# Patient Record
Sex: FEMALE | Race: WHITE | Employment: FULL TIME | URBAN - METROPOLITAN AREA
[De-identification: names, ages, dates, MRNs, and addresses within clinical notes are randomized per-mention and may not be internally consistent; named-entity substitution may affect disease eponyms.]

---

## 2018-04-13 ENCOUNTER — HOSPITAL ENCOUNTER (OUTPATIENT)
Dept: CARDIOLOGY | Age: 58
Discharge: HOME | End: 2018-04-13
Payer: COMMERCIAL

## 2018-04-13 ENCOUNTER — NURSE ONLY (OUTPATIENT)
Dept: CARDIOLOGY CLINIC | Age: 58
End: 2018-04-13
Payer: COMMERCIAL

## 2018-04-13 DIAGNOSIS — R55 SYNCOPE, UNSPECIFIED SYNCOPE TYPE: ICD-10-CM

## 2018-04-13 DIAGNOSIS — Z13.6 SCREENING FOR CARDIOVASCULAR CONDITION: ICD-10-CM

## 2018-04-13 LAB
LV EF: 55 %
LVEF MODALITY: NORMAL

## 2018-04-13 PROCEDURE — 93306 TTE W/DOPPLER COMPLETE: CPT

## 2018-04-13 PROCEDURE — 93000 ELECTROCARDIOGRAM COMPLETE: CPT | Performed by: INTERNAL MEDICINE

## 2018-05-25 ENCOUNTER — HOSPITAL ENCOUNTER (OUTPATIENT)
Dept: CARDIOLOGY | Age: 58
Discharge: HOME | End: 2018-05-25
Payer: COMMERCIAL

## 2018-05-25 VITALS
WEIGHT: 155 LBS | BODY MASS INDEX: 29.27 KG/M2 | SYSTOLIC BLOOD PRESSURE: 120 MMHG | OXYGEN SATURATION: 98 % | HEIGHT: 61 IN | DIASTOLIC BLOOD PRESSURE: 70 MMHG | HEART RATE: 82 BPM

## 2018-05-25 DIAGNOSIS — I20.8 ANGINAL EQUIVALENT (HCC): Primary | ICD-10-CM

## 2018-05-25 PROCEDURE — A9500 TC99M SESTAMIBI: HCPCS | Performed by: INTERNAL MEDICINE

## 2018-05-25 PROCEDURE — 93017 CV STRESS TEST TRACING ONLY: CPT

## 2018-05-25 PROCEDURE — 2580000003 HC RX 258: Performed by: INTERNAL MEDICINE

## 2018-05-25 PROCEDURE — 3430000000 HC RX DIAGNOSTIC RADIOPHARMACEUTICAL: Performed by: INTERNAL MEDICINE

## 2018-05-25 PROCEDURE — 78452 HT MUSCLE IMAGE SPECT MULT: CPT

## 2018-05-25 RX ORDER — SODIUM CHLORIDE 0.9 % (FLUSH) 0.9 %
10 SYRINGE (ML) INJECTION PRN
Status: DISCONTINUED | OUTPATIENT
Start: 2018-05-25 | End: 2018-05-26 | Stop reason: HOSPADM

## 2018-05-25 RX ORDER — BUDESONIDE AND FORMOTEROL FUMARATE DIHYDRATE 160; 4.5 UG/1; UG/1
2 AEROSOL RESPIRATORY (INHALATION) 2 TIMES DAILY
COMMUNITY
End: 2019-05-13

## 2018-05-25 RX ADMIN — Medication 10 ML: at 08:46

## 2018-05-25 RX ADMIN — Medication 10 ML: at 10:04

## 2018-05-25 RX ADMIN — Medication 33.7 MILLICURIE: at 10:04

## 2018-05-25 RX ADMIN — Medication 10.6 MILLICURIE: at 08:46

## 2018-05-30 ENCOUNTER — TELEPHONE (OUTPATIENT)
Dept: NON INVASIVE DIAGNOSTICS | Age: 58
End: 2018-05-30

## 2018-06-08 ENCOUNTER — OFFICE VISIT (OUTPATIENT)
Dept: NON INVASIVE DIAGNOSTICS | Age: 58
End: 2018-06-08
Payer: COMMERCIAL

## 2018-06-08 VITALS
SYSTOLIC BLOOD PRESSURE: 118 MMHG | WEIGHT: 157 LBS | DIASTOLIC BLOOD PRESSURE: 74 MMHG | HEART RATE: 74 BPM | RESPIRATION RATE: 14 BRPM | BODY MASS INDEX: 29.64 KG/M2 | HEIGHT: 61 IN

## 2018-06-08 DIAGNOSIS — R55 VASOVAGAL SYNCOPE: Primary | ICD-10-CM

## 2018-06-08 DIAGNOSIS — I10 ESSENTIAL HYPERTENSION: ICD-10-CM

## 2018-06-08 DIAGNOSIS — E66.3 OVERWEIGHT: ICD-10-CM

## 2018-06-08 PROCEDURE — G8427 DOCREV CUR MEDS BY ELIG CLIN: HCPCS | Performed by: INTERNAL MEDICINE

## 2018-06-08 PROCEDURE — 93000 ELECTROCARDIOGRAM COMPLETE: CPT | Performed by: INTERNAL MEDICINE

## 2018-06-08 PROCEDURE — G8599 NO ASA/ANTIPLAT THER USE RNG: HCPCS | Performed by: INTERNAL MEDICINE

## 2018-06-08 PROCEDURE — 3014F SCREEN MAMMO DOC REV: CPT | Performed by: INTERNAL MEDICINE

## 2018-06-08 PROCEDURE — G8417 CALC BMI ABV UP PARAM F/U: HCPCS | Performed by: INTERNAL MEDICINE

## 2018-06-08 PROCEDURE — 3017F COLORECTAL CA SCREEN DOC REV: CPT | Performed by: INTERNAL MEDICINE

## 2018-06-08 PROCEDURE — 99204 OFFICE O/P NEW MOD 45 MIN: CPT | Performed by: INTERNAL MEDICINE

## 2018-06-08 PROCEDURE — 1036F TOBACCO NON-USER: CPT | Performed by: INTERNAL MEDICINE

## 2018-06-08 NOTE — PROGRESS NOTES
Outpatient Prescriptions   Medication Sig Dispense Refill    amLODIPine (NORVASC) 10 MG tablet take 1 tablet by mouth daily 30 tablet 3    valsartan-hydrochlorothiazide (DIOVAN-HCT) 160-25 MG per tablet take 1 tablet by mouth daily 30 tablet 3    escitalopram (LEXAPRO) 10 MG tablet Take 1 tablet by mouth daily 30 tablet 3    Calcium-Vitamin D (CALTRATE 600 PLUS-VIT D PO) Take 1 tablet by mouth      Multiple Vitamins-Minerals (THERAPEUTIC MULTIVITAMIN-MINERALS) tablet Take 1 tablet by mouth daily      budesonide-formoterol (SYMBICORT) 160-4.5 MCG/ACT AERO Inhale 2 puffs into the lungs 2 times daily      PROAIR  (90 Base) MCG/ACT inhaler take 2 puffs every 4 hours if needed  0     No current facility-administered medications for this visit. No Known Allergies    ROS:   Constitutional: Negative for fever, activity change and appetite change. HENT: Negative for epistaxis. Eyes: Negative for diploplia, blurred vision. Respiratory: Negative for cough, chest tightness, shortness of breath and wheezing. Cardiovascular: pertinent positives in HPI  Gastrointestinal: Negative for abdominal pain and blood in stool. All other review of systems are negative     PHYSICAL EXAM:   Constitutional   Vitals:    06/08/18 0828   BP: 118/74   Pulse: 74   Resp: 14   Weight: 157 lb (71.2 kg)   Height: 5' 1\" (1.549 m)    Well-developed, no acute distress, well groomed  Eyes: conjunctivae normal, no xanthelasma   Ears, Nose, Throat: oral mucosa moist, no cyanosis   CV: normal rate, regular rhythm, no JVD no murmurs, rubs, or gallops.  PMI is nondisplaced, Peripheral pulses normal including carotid auscultation, no noted aortic bruit, bilateral femoral and pedal pulses are normal in quality  Lungs: clear to auscultation bilaterally, normal respiratory effort without used of accessory muscles, no wheezes  Abdomen: soft, non-tender, bowel sounds present, no masses or hepatomegaly   Musculoskeletal: no digital Dimension: 2.6 cm   LV FS:28.6 %    ml   LV PW           LV Volume Systolic: 58.5 ml   Diastolic: 0.8  LV EDV/LV EDV Index: 115.5   cm              ml/67 ml/m^2LV ESV/LV ESV    RV Diastolic Dimension: 3.9   LV PW Systolic: Index: 58.1 SK/95OZ/ m^2     cm   1.3 cm          EF Calculated: 56.3 %   Septum          LV Mass Index: 83 l/min*m^2  LA/Aorta: 3.67   Diastolic: 0.9                               Ascending Aorta: 3.1 cm   cm                                           LA volume/Index: 47.7 ml   Septum          LVOT: 1.7 cm                 /78ZI/E^2   Systolic: 1.1                                RA Area: 12.2 cm^2   cm                                                IVC Expiration: 1.2 cm   LV Mass: 141.91   g     Doppler Measurements & Calculations      MV Peak E-Wave:   AV Peak Velocity: 1.96 m/s     LVOT Peak Velocity: 1.36   1.1 m/s           AV Peak Gradient: 15.41 mmHg   m/s   MV Peak A-Wave:   AV Mean Velocity: 1.11 m/s     LVOT Mean Velocity: 0.81   0.85 m/s          AV Mean Gradient: 6 mmHg       m/s   MV E/A Ratio:     AV VTI: 42.9 cm                LVOT Peak Gradient: 7.4   1.29              AV Area (Continuity):1.41 cm^2 mmHgLVOT Mean Gradient: 3   MV Peak Gradient:                                mmHg   5.2 mmHg          LVOT VTI: 26.7 cm              Estimated RVSP: 30.3 mmHg   MV Mean Gradient: IVRT: 96.9 msec                Estimated RAP:3 mmHg   1.9 mmHg          Estimated PASP: 30.27 mmHg   MV Mean Velocity: Pulm. Vein A Reversal   0.62 m/s          Duration:83 msec               TR Velocity:2.61 m/s   MV Deceleration   Pulm. Vein D Velocity:0.45     TR Gradient:27.27 mmHg   Time: 169.1 msec  m/sPulm. Vein A Reversal       PV Peak Velocity: 1.24   MV P1/2t: 84.4    Velocity:0.29 m/s              m/s   msec              Pulm.  Vein S Velocity: 0.71    PV Peak Gradient: 6.11   MVA by PHT:2.61   m/s                            mmHg   cm^2                                             PV Mean Velocity: 0.69   MV Area                                          m/s   (continuity): 1.7                                PV Mean Gradient: 2.3   cm^2                                             mmHg   MV E' Septal   Velocity: 0.07   m/s   MV E' Lateral   Velocity: 10 m/s               Last Stress Test:   [address] [address],[facility] - [address]  [phone]                                            Exercise Stress Nuclear Gated SPECT Study     Name: [facility] Account Number: [de-identified]     :  1960      Sex: female                                                          Date of Study:  2018     Height: 5' 1\" (154.9 cm)  Weight: 155 lb (70.3 kg)          Ordering Provider: Roma Stuart MD  PCP: Roma Stuart MD       Cardiologist: Nabil Cabral                         Interpreting Physician: Sarika Alexander. [facility], [place], [place]  _________________________________________________________________________________     Indication:        Detecting the presence and location of coronary artery disease     Clinical History:   Patient has no known history of coronary artery disease.     Resting ECG:    DC int .18 sec, QRS int .08 sec, QT int .38 sec; HR 79 bpm  Normal sinus rhythm     Exercise: The patient exercised using a Yordy protocol, completing 8.01 minutes and reaching an estimated work load of 35.8 metabolic equivalents (METS). Resting HR was 79. Peak exercise heart rate was 143 ( 87% of maximum predicted heart rate for age). Baseline /82. Peak exercise /84.      The blood pressure response to exercise was normal       Exercise was terminated due to general exercise fatigue, including increased breathing. . She denied chest discomfort associated with increased breathing during stress exercise     The patient experienced no chest pain with exercise.        Pulse oximetry was used to monitor oxygen saturation during the stress test.  The given in a handout  - removal of any medications which CAN exacerbate her dizziness were also discussed   - we discussed avoidance manuvers including counterpulsation and recognition of symptoms so to perform these avoidance maneuvers as discussed  - EKG has not shown any other clear arrhythmogenic cause (WPW, HOCM, LQT, Brugada, ARVC)  - discussed increased sodium intake, increased fluid intake, compression stockings, counterpulsation maneuvers  - discussed consideration of medical therapy IF lifestyle modifications are not sufficient, but they are willing to try to proceed without medical therapy for now  - offered ILR insertion to look for paroxysmal arrhythmias  - we discussed option of tilt table testing, however would also be reasonable to attempt these preventative measures 1st prior to testing    2. HTN  - well controlled at this visit  - monitored by PCP    3. Anxiety  - on Lexapro    4. Overweight  - Body mass index is 29.66 kg/m². - encouraged weight management    Plan:  1. Continue current medication  2. Encouraged increased sodium intake, increased fluid intake, compression stockings, counterpulsation maneuvers  3. Patient to consider Tilt table or ILR insertion - will call the office with her decision  4. Continual monitoring of her BP at home  5. Patient to follow up PRN. Instructed the patient to call if she has any questions or concerns. Thank you for allowing me to participate in your patient's care. Please call me if there are any questions.       Marilu Hooper MD, [facility]  Cardiac Electrophysiology  [facility] [place] [facility]  [facility]: [facility]  8:46 AM  6/8/2018

## 2018-07-20 PROBLEM — R55 VASOVAGAL SYNCOPE: Status: ACTIVE | Noted: 2018-07-20

## 2018-07-20 PROBLEM — E66.3 OVERWEIGHT: Status: ACTIVE | Noted: 2018-07-20

## 2021-05-10 ENCOUNTER — OFFICE VISIT (OUTPATIENT)
Dept: FAMILY MEDICINE CLINIC | Age: 61
End: 2021-05-10
Payer: COMMERCIAL

## 2021-05-10 VITALS
SYSTOLIC BLOOD PRESSURE: 126 MMHG | DIASTOLIC BLOOD PRESSURE: 82 MMHG | RESPIRATION RATE: 18 BRPM | HEART RATE: 84 BPM | TEMPERATURE: 97.8 F | OXYGEN SATURATION: 99 % | HEIGHT: 61 IN | WEIGHT: 138 LBS | BODY MASS INDEX: 26.06 KG/M2

## 2021-05-10 DIAGNOSIS — M25.512 ACUTE PAIN OF LEFT SHOULDER: ICD-10-CM

## 2021-05-10 DIAGNOSIS — M54.12 CERVICAL RADICULOPATHY: ICD-10-CM

## 2021-05-10 DIAGNOSIS — M54.2 NECK PAIN: Primary | ICD-10-CM

## 2021-05-10 PROCEDURE — 99203 OFFICE O/P NEW LOW 30 MIN: CPT | Performed by: PHYSICIAN ASSISTANT

## 2021-05-10 PROCEDURE — 3017F COLORECTAL CA SCREEN DOC REV: CPT | Performed by: PHYSICIAN ASSISTANT

## 2021-05-10 PROCEDURE — G8419 CALC BMI OUT NRM PARAM NOF/U: HCPCS | Performed by: PHYSICIAN ASSISTANT

## 2021-05-10 PROCEDURE — 96372 THER/PROPH/DIAG INJ SC/IM: CPT | Performed by: PHYSICIAN ASSISTANT

## 2021-05-10 PROCEDURE — G8427 DOCREV CUR MEDS BY ELIG CLIN: HCPCS | Performed by: PHYSICIAN ASSISTANT

## 2021-05-10 PROCEDURE — 1036F TOBACCO NON-USER: CPT | Performed by: PHYSICIAN ASSISTANT

## 2021-05-10 RX ORDER — TIZANIDINE 4 MG/1
4 TABLET ORAL 4 TIMES DAILY PRN
Qty: 40 TABLET | Refills: 0 | Status: SHIPPED | OUTPATIENT
Start: 2021-05-10

## 2021-05-10 RX ORDER — PREDNISONE 20 MG/1
TABLET ORAL
Qty: 18 TABLET | Refills: 0 | Status: SHIPPED | OUTPATIENT
Start: 2021-05-10

## 2021-05-10 RX ORDER — KETOROLAC TROMETHAMINE 30 MG/ML
30 INJECTION, SOLUTION INTRAMUSCULAR; INTRAVENOUS ONCE
Status: COMPLETED | OUTPATIENT
Start: 2021-05-10 | End: 2021-05-10

## 2021-05-10 RX ORDER — METHYLPREDNISOLONE ACETATE 80 MG/ML
60 INJECTION, SUSPENSION INTRA-ARTICULAR; INTRALESIONAL; INTRAMUSCULAR; SOFT TISSUE ONCE
Status: COMPLETED | OUTPATIENT
Start: 2021-05-10 | End: 2021-05-10

## 2021-05-10 RX ORDER — METHYLPREDNISOLONE 4 MG/1
TABLET ORAL
COMMUNITY
Start: 2021-05-06

## 2021-05-10 RX ADMIN — KETOROLAC TROMETHAMINE 30 MG: 30 INJECTION, SOLUTION INTRAMUSCULAR; INTRAVENOUS at 10:37

## 2021-05-10 RX ADMIN — METHYLPREDNISOLONE ACETATE 60 MG: 80 INJECTION, SUSPENSION INTRA-ARTICULAR; INTRALESIONAL; INTRAMUSCULAR; SOFT TISSUE at 10:37

## 2021-05-10 NOTE — PROGRESS NOTES
5/10/21  Jesus Valentin : 1960 Sex: female  Age 62 y.o. Subjective:  Chief Complaint   Patient presents with    Shoulder Pain     left arm pain for over a week          HPI:   Jesus Valentin , 62 y.o. female presents to express care for evaluation of left posterior shoulder pain, neck pain. The patient has a history of some chronic neck issues. The patient has also had a frozen shoulder in the past.  Has been evaluated by Dr. Candice Ballesteros and has been on physical therapy and had intra-articular injections performed. The patient states that she also has a history of bursitis. This seems to be different. The patient went to an urgent care on Saturday. They did images of the neck and the shoulder and gave the patient a Medrol Dosepak. For about 1 day it seemed to improve. The patient states that the symptoms now seem to be worsening. The patient is not having any numbness or to the left upper extremity. She is having any chest pain. This has been ongoing about a week. We discussed possible ACS symptoms and she denies any of those      ROS:   Unless otherwise stated in this report the patient's positive and negative responses for review of systems for constitutional, eyes, ENT, cardiovascular, respiratory, gastrointestinal, neurological, , musculoskeletal, and integument systems and related systems to the presenting problem are either stated in the history of present illness or were not pertinent or were negative for the symptoms and/or complaints related to the presenting medical problem. Positives and pertinent negatives as per HPI. All others reviewed and are negative.       PMH:     Past Medical History:   Diagnosis Date    Hypertension        Past Surgical History:   Procedure Laterality Date     SECTION         Family History   Problem Relation Age of Onset    Hypertension Mother     Mult Sclerosis Brother        Medications:     Current Outpatient Medications:    methylPREDNISolone (MEDROL DOSEPACK) 4 MG tablet, use as directed for 6 days, Disp: , Rfl:     predniSONE (DELTASONE) 20 MG tablet, 3 tablets once daily for 3 days, 2 tablets once daily for 3 days, one tablet once daily for 3 days, Disp: 18 tablet, Rfl: 0    tiZANidine (ZANAFLEX) 4 MG tablet, Take 1 tablet by mouth 4 times daily as needed (neck/back pain), Disp: 40 tablet, Rfl: 0    amLODIPine (NORVASC) 10 MG tablet, take 1 tablet by mouth once daily, Disp: 30 tablet, Rfl: 3    escitalopram (LEXAPRO) 10 MG tablet, take 1 tablet by mouth once daily, Disp: 30 tablet, Rfl: 2    valsartan-hydroCHLOROthiazide (DIOVAN-HCT) 160-25 MG per tablet, take 1 tablet by mouth once daily, Disp: 30 tablet, Rfl: 3    Calcium-Vitamin D (CALTRATE 600 PLUS-VIT D PO), Take 1 tablet by mouth, Disp: , Rfl:     Multiple Vitamins-Minerals (THERAPEUTIC MULTIVITAMIN-MINERALS) tablet, Take 1 tablet by mouth daily, Disp: , Rfl:     Allergies:   No Known Allergies    Social History:     Social History     Tobacco Use    Smoking status: Never Smoker    Smokeless tobacco: Never Used   Substance Use Topics    Alcohol use: Yes     Comment: once a month    Drug use: No       Patient lives at home. Physical Exam:     Vitals:    05/10/21 1001   BP: 126/82   Pulse: 84   Resp: 18   Temp: 97.8 °F (36.6 °C)   SpO2: 99%   Weight: 138 lb (62.6 kg)   Height: 5' 1\" (1.549 m)       Exam:  Physical Exam  Nurses note and vital signs reviewed and patient is not hypoxic. General: The patient appears well and in no apparent distress. Patient is resting comfortably on cart. Skin: Warm, dry, no pallor noted. There is no rash noted. Head: Normocephalic, atraumatic.   Eye: Normal conjunctiva  Neck: The patient has reproducible pain to the left paracervical musculature at the base, no significant superior tenderness, no midline tenderness, no right-sided tenderness  Ears, Nose, Mouth, and Throat: Oral mucosa is moist  Cardiovascular: Regular Rate and pain  -     Katherine Alcantar DC,Chiropractic Medicine, [facility]    Cervical radiculopathy    Acute pain of left shoulder    Other orders  -     methylPREDNISolone acetate (DEPO-MEDROL) injection 60 mg  -     ketorolac (TORADOL) injection 30 mg  -     predniSONE (DELTASONE) 20 MG tablet; 3 tablets once daily for 3 days, 2 tablets once daily for 3 days, one tablet once daily for 3 days  -     tiZANidine (ZANAFLEX) 4 MG tablet; Take 1 tablet by mouth 4 times daily as needed (neck/back pain)        The patient is to call for any concerns or return if any of the signs or symptoms worsen. The patient is to follow-up with PCP in the next 2-3 days for repeat evaluation repeat assessment or go directly to the emergency department.      SIGNATURE: Geovanna Enrique III, PA-C

## 2021-05-12 ENCOUNTER — OFFICE VISIT (OUTPATIENT)
Dept: CHIROPRACTIC MEDICINE | Age: 61
End: 2021-05-12
Payer: COMMERCIAL

## 2021-05-12 VITALS — OXYGEN SATURATION: 98 % | RESPIRATION RATE: 14 BRPM | HEART RATE: 60 BPM | TEMPERATURE: 98 F

## 2021-05-12 DIAGNOSIS — M54.6 ACUTE LEFT-SIDED THORACIC BACK PAIN: Primary | ICD-10-CM

## 2021-05-12 DIAGNOSIS — M54.2 CERVICALGIA: ICD-10-CM

## 2021-05-12 PROCEDURE — 3017F COLORECTAL CA SCREEN DOC REV: CPT | Performed by: CHIROPRACTOR

## 2021-05-12 PROCEDURE — 98940 CHIROPRACT MANJ 1-2 REGIONS: CPT | Performed by: CHIROPRACTOR

## 2021-05-12 PROCEDURE — G8419 CALC BMI OUT NRM PARAM NOF/U: HCPCS | Performed by: CHIROPRACTOR

## 2021-05-12 PROCEDURE — G8427 DOCREV CUR MEDS BY ELIG CLIN: HCPCS | Performed by: CHIROPRACTOR

## 2021-05-12 PROCEDURE — 1036F TOBACCO NON-USER: CPT | Performed by: CHIROPRACTOR

## 2021-05-12 PROCEDURE — 99203 OFFICE O/P NEW LOW 30 MIN: CPT | Performed by: CHIROPRACTOR

## 2021-05-12 PROCEDURE — 97014 ELECTRIC STIMULATION THERAPY: CPT | Performed by: CHIROPRACTOR

## 2021-05-12 ASSESSMENT — ENCOUNTER SYMPTOMS
COUGH: 0
BACK PAIN: 1
SHORTNESS OF BREATH: 0

## 2021-05-12 NOTE — PROGRESS NOTES
Negative for weakness and headaches.          Current Outpatient Medications:     methylPREDNISolone (MEDROL DOSEPACK) 4 MG tablet, use as directed for 6 days, Disp: , Rfl:     predniSONE (DELTASONE) 20 MG tablet, 3 tablets once daily for 3 days, 2 tablets once daily for 3 days, one tablet once daily for 3 days, Disp: 18 tablet, Rfl: 0    tiZANidine (ZANAFLEX) 4 MG tablet, Take 1 tablet by mouth 4 times daily as needed (neck/back pain), Disp: 40 tablet, Rfl: 0    amLODIPine (NORVASC) 10 MG tablet, take 1 tablet by mouth once daily, Disp: 30 tablet, Rfl: 3    escitalopram (LEXAPRO) 10 MG tablet, take 1 tablet by mouth once daily, Disp: 30 tablet, Rfl: 2    valsartan-hydroCHLOROthiazide (DIOVAN-HCT) 160-25 MG per tablet, take 1 tablet by mouth once daily, Disp: 30 tablet, Rfl: 3    Calcium-Vitamin D (CALTRATE 600 PLUS-VIT D PO), Take 1 tablet by mouth, Disp: , Rfl:     Multiple Vitamins-Minerals (THERAPEUTIC MULTIVITAMIN-MINERALS) tablet, Take 1 tablet by mouth daily, Disp: , Rfl:     No Known Allergies    Past Medical History:   Diagnosis Date    Hypertension      Family History   Problem Relation Age of Onset    Hypertension Mother     Mult Sclerosis Brother      Past Surgical History:   Procedure Laterality Date     SECTION       Social History     Socioeconomic History    Marital status:      Spouse name: Not on file    Number of children: Not on file    Years of education: Not on file    Highest education level: Not on file   Occupational History    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 She displays full, complete active range of motion of the bilateral glenohumeral joints. Dena Garcia was seen today for neck pain, back pain and shoulder pain. Diagnoses and all orders for this visit:    Acute left-sided thoracic back pain    Cervicalgia      Attempting to track down x-rays from [address]:   I spoke with her regarding my exam findings and treatment options. Cervical radiculopathy versus left thoracic facet capsulitis. Based on my exam I believe it is leaning closer to the capsulitis but I cannot rule out the radiculopathy at this point. She does have a normal upper extremity neurological exam today. I recommended that we start a trial of conservative care today consisting EMS, manipulation/mobilization and home-based self-care. I will plan on seeing her 2 times per week for 3 weeks, then reassess to determine response to care and future options. With consent, I did begin today. Problem/Goals  Decrease pain and/or swelling    Today's Treatment  EMS with heat to the upper thoracic region for 15 minutes to address muscle spasm/hypertension and alleviate pain. Diversified manipulations listed thoracic segments. Tolerated well. Going to have her start in sphinx position --cat-cow x10 and cervical rotations x5 bilateral.  I want her doing these 2 times per day. I spoke to her regarding posttreatment soreness. Should any arise, she  may use ice for 10-15 minutes, over-the-counter NSAIDs or topical gels. She is leaving for vacation. Advised her to try some topical patches or gels during the car ride. I want her to keep it moving, do her exercisesgave her some seated variations as well. I will see her upon her return. Seen By:  Johnnie Mcgowan DC    * This note was created using voice recognition software.   The note was reviewed, however grammatical errors may exist.